# Patient Record
Sex: FEMALE | Race: WHITE | NOT HISPANIC OR LATINO | URBAN - NONMETROPOLITAN AREA
[De-identification: names, ages, dates, MRNs, and addresses within clinical notes are randomized per-mention and may not be internally consistent; named-entity substitution may affect disease eponyms.]

---

## 2023-06-15 ENCOUNTER — WEB ENCOUNTER (OUTPATIENT)
Dept: URBAN - NONMETROPOLITAN AREA CLINIC 4 | Facility: CLINIC | Age: 33
End: 2023-06-15

## 2023-06-15 ENCOUNTER — LAB OUTSIDE AN ENCOUNTER (OUTPATIENT)
Dept: URBAN - NONMETROPOLITAN AREA CLINIC 4 | Facility: CLINIC | Age: 33
End: 2023-06-15

## 2023-06-15 ENCOUNTER — OFFICE VISIT (OUTPATIENT)
Dept: URBAN - NONMETROPOLITAN AREA CLINIC 4 | Facility: CLINIC | Age: 33
End: 2023-06-15
Payer: COMMERCIAL

## 2023-06-15 VITALS
DIASTOLIC BLOOD PRESSURE: 101 MMHG | HEART RATE: 96 BPM | BODY MASS INDEX: 19.16 KG/M2 | SYSTOLIC BLOOD PRESSURE: 137 MMHG | TEMPERATURE: 97.5 F | HEIGHT: 65 IN | WEIGHT: 115 LBS

## 2023-06-15 DIAGNOSIS — K31.84 GASTROPARESIS: ICD-10-CM

## 2023-06-15 DIAGNOSIS — R19.8 TENESMUS: ICD-10-CM

## 2023-06-15 DIAGNOSIS — K58.1 IRRITABLE BOWEL SYNDROME WITH CONSTIPATION: ICD-10-CM

## 2023-06-15 PROBLEM — 440630006: Status: ACTIVE | Noted: 2023-06-15

## 2023-06-15 PROBLEM — 235675006: Status: ACTIVE | Noted: 2023-06-15

## 2023-06-15 PROCEDURE — 99204 OFFICE O/P NEW MOD 45 MIN: CPT | Performed by: INTERNAL MEDICINE

## 2023-06-15 RX ORDER — LISINOPRIL AND HYDROCHLOROTHIAZIDE 10; 12.5 MG/1; MG/1
1 TABLET TABLET ORAL ONCE A DAY
Status: ACTIVE | COMMUNITY

## 2023-06-15 RX ORDER — ESCITALOPRAM OXALATE 20 MG/1
1 TABLET TABLET ORAL ONCE A DAY
Status: ACTIVE | COMMUNITY

## 2023-06-15 RX ORDER — LACTULOSE 10 G/15ML
15 ML AS NEEDED SOLUTION ORAL ONCE A DAY
Status: ACTIVE | COMMUNITY

## 2023-06-15 NOTE — HPI-TODAY'S VISIT:
2021 started to see GI. Pt reports that she had an endoscopy diagnosed with gastroparesis. Pt reports that she had a colonoscopy later.   Pt reports that she was extremely constipated. Pt reports that she would have 1 bowel movement per week.   Pt reports that she would try to get help.  Pt reports that she was placed on motegrity but this was complicated by migraine headaches. Pt reports that she had linzess as well. Pt reports that she was placed on lactulose.  Pt reports that she feels bad.   Pt reports that she tried miralax and fiber.  Pt reprots that she was advised to limit to fiber due to gastroparesis. Pt reports that she was then told she did not have many more options. Pt reports that she was told that she had to have another plan. Pt reports that she had a lot of problems in general.  Pt reports that she does not feel like she empties all the way.   Pt reports that she will have soft bowel movements.   Pt reports that she gets bloated and sick.  Pt reports that she will look pregnant.   Pt reports that she took a stool softener.  Pt reports that she took dulcolax as well.   Pt reports that she had emptying of entire colon after prolonged delay. Went to bathroom about 4-5 times before done.  Pt state that she has had a lot of mucous in the stools.

## 2023-06-26 LAB
ADENOVIRUS F40/41: NEGATIVE
ASTROVIRUS: NEGATIVE
C.DIFFICILE TOXIN: NEGATIVE
CAMPYLOBACTER SPECIES: NEGATIVE
CRYPTOSPORIDIUM SPECIES: NEGATIVE
CYCLOSPORA CAYETANENSIS: NEGATIVE
ENTAMOEBA HISTOLYTICA: NEGATIVE
ENTEROAGGREGATIVE E. COLI (EAEC): NEGATIVE
ENTEROPATHOGENIC E. COLI (EPEC): POSITIVE
ENTEROTOXIGENIC E. COLI (ETEC): NEGATIVE
GIARDIA: NEGATIVE
NOROVIRUS GI/GII: POSITIVE
PLESIOMONAS SHIGELLOIDES: NEGATIVE
ROTAVIRUS: NEGATIVE
SALMONELLA SPECIES: NEGATIVE
SAPOVIRUS: NEGATIVE
SHIGA TOXIN PRODUCING E. COLI: NEGATIVE
SHIGELLA/ENTEROINVASIVE E. COLI: NEGATIVE
SPECIMEN SOURCE: (no result)
TRICHROME (1): (no result)
VIBRIO CHOLERAE: NEGATIVE
VIBRIO SPECIES: NEGATIVE
YERSINIA SPECIES: NEGATIVE

## 2023-06-27 ENCOUNTER — TELEPHONE ENCOUNTER (OUTPATIENT)
Dept: URBAN - METROPOLITAN AREA CLINIC 54 | Facility: CLINIC | Age: 33
End: 2023-06-27

## 2023-06-27 RX ORDER — METRONIDAZOLE 250 MG/1
1 TABLET TABLET ORAL TWICE A DAY
Qty: 20 TABLET | Refills: 0 | OUTPATIENT
Start: 2023-06-27 | End: 2023-07-07

## 2023-07-26 ENCOUNTER — LAB OUTSIDE AN ENCOUNTER (OUTPATIENT)
Dept: URBAN - METROPOLITAN AREA CLINIC 54 | Facility: CLINIC | Age: 33
End: 2023-07-26

## 2023-07-26 ENCOUNTER — TELEPHONE ENCOUNTER (OUTPATIENT)
Dept: URBAN - METROPOLITAN AREA CLINIC 54 | Facility: CLINIC | Age: 33
End: 2023-07-26

## 2023-07-28 ENCOUNTER — TELEPHONE ENCOUNTER (OUTPATIENT)
Dept: URBAN - METROPOLITAN AREA CLINIC 54 | Facility: CLINIC | Age: 33
End: 2023-07-28

## 2023-07-31 ENCOUNTER — OFFICE VISIT (OUTPATIENT)
Dept: URBAN - NONMETROPOLITAN AREA CLINIC 4 | Facility: CLINIC | Age: 33
End: 2023-07-31

## 2023-09-11 ENCOUNTER — OFFICE VISIT (OUTPATIENT)
Dept: URBAN - NONMETROPOLITAN AREA CLINIC 4 | Facility: CLINIC | Age: 33
End: 2023-09-11

## 2023-09-12 ENCOUNTER — TELEPHONE ENCOUNTER (OUTPATIENT)
Dept: URBAN - METROPOLITAN AREA CLINIC 63 | Facility: CLINIC | Age: 33
End: 2023-09-12

## 2023-10-26 ENCOUNTER — OFFICE VISIT (OUTPATIENT)
Dept: URBAN - NONMETROPOLITAN AREA CLINIC 4 | Facility: CLINIC | Age: 33
End: 2023-10-26

## 2023-12-18 ENCOUNTER — OFFICE VISIT (OUTPATIENT)
Dept: URBAN - NONMETROPOLITAN AREA CLINIC 4 | Facility: CLINIC | Age: 33
End: 2023-12-18
Payer: COMMERCIAL

## 2023-12-18 ENCOUNTER — DASHBOARD ENCOUNTERS (OUTPATIENT)
Age: 33
End: 2023-12-18

## 2023-12-18 VITALS
SYSTOLIC BLOOD PRESSURE: 128 MMHG | WEIGHT: 112.8 LBS | DIASTOLIC BLOOD PRESSURE: 99 MMHG | TEMPERATURE: 97 F | HEART RATE: 92 BPM | BODY MASS INDEX: 18.8 KG/M2 | HEIGHT: 65 IN

## 2023-12-18 DIAGNOSIS — G90.A POSTURAL ORTHOSTATIC TACHYCARDIA SYNDROME [POTS]: ICD-10-CM

## 2023-12-18 DIAGNOSIS — K58.1 IRRITABLE BOWEL SYNDROME WITH CONSTIPATION: ICD-10-CM

## 2023-12-18 PROCEDURE — 99214 OFFICE O/P EST MOD 30 MIN: CPT | Performed by: INTERNAL MEDICINE

## 2023-12-18 RX ORDER — TOPIRAMATE 25 MG/1
1 TABLET TABLET, FILM COATED ORAL ONCE A DAY
Status: ACTIVE | COMMUNITY

## 2023-12-18 RX ORDER — LISINOPRIL AND HYDROCHLOROTHIAZIDE 10; 12.5 MG/1; MG/1
1 TABLET TABLET ORAL ONCE A DAY
Status: ACTIVE | COMMUNITY

## 2023-12-18 RX ORDER — LACTULOSE 10 G/15ML
15 ML AS NEEDED SOLUTION ORAL ONCE A DAY
Status: ACTIVE | COMMUNITY

## 2023-12-18 RX ORDER — ESCITALOPRAM OXALATE 20 MG/1
1 TABLET TABLET ORAL ONCE A DAY
Status: ACTIVE | COMMUNITY

## 2023-12-18 NOTE — HPI-TODAY'S VISIT:
2021 started to see GI. Pt reports that she had an endoscopy diagnosed with gastroparesis. Pt reports that she had a colonoscopy later.   Pt reports that she was extremely constipated. Pt reports that she would have 1 bowel movement per week.   Pt reports that she would try to get help.  Pt reports that she was placed on motegrity but this was complicated by migraine headaches. Pt reports that she had linzess as well. Pt reports that she was placed on lactulose.  Pt reports that she feels bad.   Pt reports that she tried miralax and fiber.  Pt reprots that she was advised to limit to fiber due to gastroparesis. Pt reports that she was then told she did not have many more options. Pt reports that she was told that she had to have another plan. Pt reports that she had a lot of problems in general.  Pt reports that she does not feel like she empties all the way.   Pt reports that she will have soft bowel movements.   Pt reports that she gets bloated and sick.  Pt reports that she will look pregnant.   Pt reports that she took a stool softener.  Pt reports that she took dulcolax as well.   Pt reports that she had emptying of entire colon after prolonged delay. Went to bathroom about 4-5 times before done.  Pt state that she has had a lot of mucous in the stools. 12-18-23 Pt with heart racing on meds.  Pt reports that she does not have POTS syndrome.  Pt reports that she has 1 bowel movement.  Pt reports that BM are incomplete.  Pt reports that she has no blood or mucous in stools.  Pt reports that she had a colonoscopy with 2022.  Pt reports that she has some abdominal pain.   Pt reports that he feels release of pressure when she has a bM - willhave sx around menstrual cycle. Has syncope and fatigue.

## 2024-05-24 ENCOUNTER — LAB OUTSIDE AN ENCOUNTER (OUTPATIENT)
Dept: URBAN - NONMETROPOLITAN AREA CLINIC 4 | Facility: CLINIC | Age: 34
End: 2024-05-24

## 2024-05-24 ENCOUNTER — OFFICE VISIT (OUTPATIENT)
Dept: URBAN - NONMETROPOLITAN AREA CLINIC 4 | Facility: CLINIC | Age: 34
End: 2024-05-24
Payer: COMMERCIAL

## 2024-05-24 VITALS
SYSTOLIC BLOOD PRESSURE: 127 MMHG | BODY MASS INDEX: 17.69 KG/M2 | TEMPERATURE: 98.7 F | DIASTOLIC BLOOD PRESSURE: 97 MMHG | HEIGHT: 65 IN | HEART RATE: 68 BPM | WEIGHT: 106.2 LBS

## 2024-05-24 DIAGNOSIS — K58.0 IRRITABLE BOWEL SYNDROME WITH DIARRHEA: ICD-10-CM

## 2024-05-24 DIAGNOSIS — G90.A POSTURAL ORTHOSTATIC TACHYCARDIA SYNDROME [POTS]: ICD-10-CM

## 2024-05-24 DIAGNOSIS — R19.8 TENESMUS: ICD-10-CM

## 2024-05-24 PROCEDURE — 99214 OFFICE O/P EST MOD 30 MIN: CPT | Performed by: PHYSICIAN ASSISTANT

## 2024-05-24 RX ORDER — ESCITALOPRAM OXALATE 20 MG/1
1 TABLET TABLET ORAL ONCE A DAY
COMMUNITY

## 2024-05-24 RX ORDER — LACTULOSE 10 G/15ML
15 ML AS NEEDED SOLUTION ORAL ONCE A DAY
COMMUNITY

## 2024-05-24 RX ORDER — TOPIRAMATE 25 MG/1
1 TABLET TABLET, FILM COATED ORAL ONCE A DAY
COMMUNITY

## 2024-05-24 RX ORDER — LISINOPRIL AND HYDROCHLOROTHIAZIDE 20; 12.5 MG/1; MG/1
1 TABLET TABLET ORAL ONCE A DAY
COMMUNITY

## 2024-05-24 RX ORDER — RIFAXIMIN 550 MG/1
1 TABLET TABLET ORAL THREE TIMES A DAY
Qty: 42 TABLET | Refills: 0 | OUTPATIENT
Start: 2024-05-24 | End: 2024-06-07

## 2024-07-22 ENCOUNTER — OFFICE VISIT (OUTPATIENT)
Dept: URBAN - NONMETROPOLITAN AREA CLINIC 4 | Facility: CLINIC | Age: 34
End: 2024-07-22

## 2024-07-22 RX ORDER — LISINOPRIL AND HYDROCHLOROTHIAZIDE 20; 12.5 MG/1; MG/1
1 TABLET TABLET ORAL ONCE A DAY
Status: ACTIVE | COMMUNITY

## 2024-07-22 RX ORDER — LACTULOSE 10 G/15ML
15 ML AS NEEDED SOLUTION ORAL ONCE A DAY
Status: ACTIVE | COMMUNITY

## 2024-07-22 RX ORDER — ESCITALOPRAM OXALATE 20 MG/1
1 TABLET TABLET ORAL ONCE A DAY
Status: ACTIVE | COMMUNITY

## 2024-07-22 RX ORDER — TOPIRAMATE 25 MG/1
1 TABLET TABLET, FILM COATED ORAL ONCE A DAY
Status: ACTIVE | COMMUNITY